# Patient Record
Sex: MALE | Race: WHITE | NOT HISPANIC OR LATINO | URBAN - METROPOLITAN AREA
[De-identification: names, ages, dates, MRNs, and addresses within clinical notes are randomized per-mention and may not be internally consistent; named-entity substitution may affect disease eponyms.]

---

## 2017-09-08 ENCOUNTER — EMERGENCY (EMERGENCY)
Facility: HOSPITAL | Age: 82
LOS: 1 days | Discharge: HOME | End: 2017-09-08
Attending: EMERGENCY MEDICINE | Admitting: EMERGENCY MEDICINE
Payer: MEDICARE

## 2017-09-08 VITALS
RESPIRATION RATE: 18 BRPM | SYSTOLIC BLOOD PRESSURE: 163 MMHG | OXYGEN SATURATION: 97 % | HEART RATE: 79 BPM | DIASTOLIC BLOOD PRESSURE: 100 MMHG | TEMPERATURE: 97 F

## 2017-09-08 VITALS
HEART RATE: 75 BPM | SYSTOLIC BLOOD PRESSURE: 152 MMHG | OXYGEN SATURATION: 98 % | DIASTOLIC BLOOD PRESSURE: 96 MMHG | RESPIRATION RATE: 18 BRPM

## 2017-09-08 LAB
ALBUMIN SERPL ELPH-MCNC: 4.6 G/DL — SIGNIFICANT CHANGE UP (ref 3.3–5)
ALP SERPL-CCNC: 97 U/L — SIGNIFICANT CHANGE UP (ref 40–120)
ALT FLD-CCNC: 12 U/L RC — SIGNIFICANT CHANGE UP (ref 10–45)
ANION GAP SERPL CALC-SCNC: 13 MMOL/L — SIGNIFICANT CHANGE UP (ref 5–17)
APTT BLD: 53.2 SEC — HIGH (ref 27.5–37.4)
AST SERPL-CCNC: 27 U/L — SIGNIFICANT CHANGE UP (ref 10–40)
BASOPHILS # BLD AUTO: 0 K/UL — SIGNIFICANT CHANGE UP (ref 0–0.2)
BASOPHILS NFR BLD AUTO: 0.4 % — SIGNIFICANT CHANGE UP (ref 0–2)
BILIRUB SERPL-MCNC: 0.7 MG/DL — SIGNIFICANT CHANGE UP (ref 0.2–1.2)
BUN SERPL-MCNC: 22 MG/DL — SIGNIFICANT CHANGE UP (ref 7–23)
CALCIUM SERPL-MCNC: 9.6 MG/DL — SIGNIFICANT CHANGE UP (ref 8.4–10.5)
CHLORIDE SERPL-SCNC: 105 MMOL/L — SIGNIFICANT CHANGE UP (ref 96–108)
CO2 SERPL-SCNC: 27 MMOL/L — SIGNIFICANT CHANGE UP (ref 22–31)
CREAT SERPL-MCNC: 1.27 MG/DL — SIGNIFICANT CHANGE UP (ref 0.5–1.3)
EOSINOPHIL # BLD AUTO: 0.2 K/UL — SIGNIFICANT CHANGE UP (ref 0–0.5)
EOSINOPHIL NFR BLD AUTO: 2.2 % — SIGNIFICANT CHANGE UP (ref 0–6)
GLUCOSE SERPL-MCNC: 98 MG/DL — SIGNIFICANT CHANGE UP (ref 70–99)
HCT VFR BLD CALC: 46.4 % — SIGNIFICANT CHANGE UP (ref 39–50)
HGB BLD-MCNC: 15.6 G/DL — SIGNIFICANT CHANGE UP (ref 13–17)
INR BLD: 3.5 RATIO — HIGH (ref 0.88–1.16)
LYMPHOCYTES # BLD AUTO: 1.5 K/UL — SIGNIFICANT CHANGE UP (ref 1–3.3)
LYMPHOCYTES # BLD AUTO: 16.1 % — SIGNIFICANT CHANGE UP (ref 13–44)
MCHC RBC-ENTMCNC: 28.5 PG — SIGNIFICANT CHANGE UP (ref 27–34)
MCHC RBC-ENTMCNC: 33.7 GM/DL — SIGNIFICANT CHANGE UP (ref 32–36)
MCV RBC AUTO: 84.7 FL — SIGNIFICANT CHANGE UP (ref 80–100)
MONOCYTES # BLD AUTO: 0.5 K/UL — SIGNIFICANT CHANGE UP (ref 0–0.9)
MONOCYTES NFR BLD AUTO: 5.2 % — SIGNIFICANT CHANGE UP (ref 2–14)
NEUTROPHILS # BLD AUTO: 7.1 K/UL — SIGNIFICANT CHANGE UP (ref 1.8–7.4)
NEUTROPHILS NFR BLD AUTO: 76.1 % — SIGNIFICANT CHANGE UP (ref 43–77)
PLATELET # BLD AUTO: 204 K/UL — SIGNIFICANT CHANGE UP (ref 150–400)
POTASSIUM SERPL-MCNC: 4.3 MMOL/L — SIGNIFICANT CHANGE UP (ref 3.5–5.3)
POTASSIUM SERPL-SCNC: 4.3 MMOL/L — SIGNIFICANT CHANGE UP (ref 3.5–5.3)
PROT SERPL-MCNC: 7.9 G/DL — SIGNIFICANT CHANGE UP (ref 6–8.3)
PROTHROM AB SERPL-ACNC: 39.1 SEC — HIGH (ref 9.8–12.7)
RBC # BLD: 5.48 M/UL — SIGNIFICANT CHANGE UP (ref 4.2–5.8)
RBC # FLD: 13.4 % — SIGNIFICANT CHANGE UP (ref 10.3–14.5)
SODIUM SERPL-SCNC: 145 MMOL/L — SIGNIFICANT CHANGE UP (ref 135–145)
WBC # BLD: 9.4 K/UL — SIGNIFICANT CHANGE UP (ref 3.8–10.5)
WBC # FLD AUTO: 9.4 K/UL — SIGNIFICANT CHANGE UP (ref 3.8–10.5)

## 2017-09-08 PROCEDURE — 85027 COMPLETE CBC AUTOMATED: CPT

## 2017-09-08 PROCEDURE — 80053 COMPREHEN METABOLIC PANEL: CPT

## 2017-09-08 PROCEDURE — 70450 CT HEAD/BRAIN W/O DYE: CPT

## 2017-09-08 PROCEDURE — 70450 CT HEAD/BRAIN W/O DYE: CPT | Mod: 26

## 2017-09-08 PROCEDURE — 72125 CT NECK SPINE W/O DYE: CPT | Mod: 26

## 2017-09-08 PROCEDURE — 85610 PROTHROMBIN TIME: CPT

## 2017-09-08 PROCEDURE — 99284 EMERGENCY DEPT VISIT MOD MDM: CPT | Mod: 25

## 2017-09-08 PROCEDURE — 72125 CT NECK SPINE W/O DYE: CPT

## 2017-09-08 PROCEDURE — 85730 THROMBOPLASTIN TIME PARTIAL: CPT

## 2017-09-08 PROCEDURE — 99284 EMERGENCY DEPT VISIT MOD MDM: CPT | Mod: GC

## 2017-09-08 NOTE — ED ADULT NURSE NOTE - PMH
Anemia    Aortic stenosis    Asthma    Basal cell cancer  s/p excision 7/2014  BPH (benign prostatic hypertrophy)    CAD (coronary artery disease)    Carotid artery disease    Colon cancer    Complete heart block    CVA (cerebral infarction)  1/2010  Depression    HTN (hypertension)    PNA (pneumonia)  3/2012  Ventricular tachycardia

## 2017-09-08 NOTE — ED PROVIDER NOTE - OBJECTIVE STATEMENT
82y Male PMH AS s/p AVR on coumadin, anemia, HTN complaining of fall. Patient slipped and lost his balance on carpeted steps at home at 4pm, fell on the ground on his buttocks, hit the occiput of his head through the plasterboard wall and had mild abrasions on the posterior head and neck, mild abrasion on the anterior right ear that did not penetrate through cartilage. Patient without pain, no LOC, able to ambulate without issues. Mainly concerned for head injury on coumadin. No walker or cane at [place], denies major recurrent falls. Last tetanus vaccine < 5 years ago. Denies fevers, chills, nausea, vomiting, diarrhea, constipation, chest pain, abd pain, or dyspnea.     PCP: Dr. Steven Goldberg.

## 2017-09-08 NOTE — ED PROVIDER NOTE - NS ED ROS FT
ROS: GENERAL: no fevers, no chills HEENT: no epistaxis, no eye pain, no ear pain, no throat pain CARDIAC: no chest pain, no shortness of breath PULM: no cough, no shortness of breath GI: no nausea, no vomiting, no diarrhea, no abdominal pain, no hematemesis, no bright red blood per rectum : no dysuria, no hematuria EXTREMITIES: no arm pain, no leg pain, no back pain SKIN: no purpura, no petechiae, + abrasions NEURO: no headache, no neck pain, no loss of strength/sensation HEME: no easy bruising, no easy bleeding

## 2017-09-08 NOTE — ED PROVIDER NOTE - ATTENDING CONTRIBUTION TO CARE
Patient on coumadin for mechanical heart valve presenting after mechanical fall with head trauma.  Unknown last INR.  No LOC, no vision changes, no nausea or vomiting.  Ambulatory after fall.  No other complaints.  On exam patient mildly hypertensive, otherwise vital signs within normal limits, neurologically intact, ambulatory in ED, no visible trauma.  Given age and on anticoagulation will CT head for screening for occult injury, check INR.

## 2017-09-08 NOTE — ED ADULT NURSE NOTE - OBJECTIVE STATEMENT
81 yo male came to the ED s/p fall on stairs of his house and hit the back of his head. Pt states he did not LOC, remembers everything and "I feel good, I just came because I am on coumadin and my cardiologist said I always need to come when I fall". Pt walked to the stretcher, steady gate, alerted and oriented x3 with family at the bedside. Patient has no active bleeding, back of the neck with redness and part of his left shoulder. Heart sounds normal, vitals WNL, lungs clear. Abdomen soft, non distended. Pt able to move all extremities, no break on skin. Pulses strong and present on all extremities. Blood drawn, sent to lab, IV meryc placed on left AC 20 G. MD at the bedside. Will continue to monitor closely. 81 yo male came to the ED s/p fall on stairs of his house and hit the back of his head. Pt states he did not LOC, remembers everything and "I feel good, I just came because I am on coumadin and my cardiologist said I always need to come when I fall". Pt walked to the stretcher, steady gate, alerted and oriented x3 with family at the bedside. Patient has no active bleeding, back of the neck with redness and part of his left shoulder. Heart sounds normal, vitals WNL, lungs clear. Abdomen soft, non distended. Vitals wnl except BP - higher on arrival, at this moment, 152/96 Pt able to move all extremities, no break on skin. Pulses strong and present on all extremities. Blood drawn, sent to lab, IV mercy placed on left AC 20 G. MD at the bedside. Will continue to monitor closely.

## 2017-09-08 NOTE — ED PROVIDER NOTE - PHYSICAL EXAMINATION
PE: CONSTITUTIONAL: Well appearing, well nourished, in no apparent distress. ENMT: Airway patent, nasal mucosa clear, mouth with normal mucosa. HEAD: NCAT EYES: PERRL, EOMI bilaterally CARDIAC: RRR, no m/r/g, no pedal edema RESPIRATORY: CTA bilaterally, no adventitious sounds GI: Abdomen non-distended, non-tender MSK: Spine appears normal, range of motion is not limited, no muscle/joint tenderness NEURO: CNII-XII grossly intact, 5/5 strength, full sensation all extremities, gait intact SKIN: Skin tone normal in color, warm and dry. Mild abrasions on the posterior neck without bleeding, mild dried blood on the anterior ear without penetrating injury.

## 2017-09-08 NOTE — ED PROVIDER NOTE - CARE PLAN
Principal Discharge DX:	Head injury  Instructions for follow-up, activity and diet:	1) Please follow-up with your primary care doctor within the next 3 days.  Please call today or tomorrow for an appointment.  If you cannot follow-up with your doctor(s), please return to the ED for any urgent issues.  2) If you have any worsening of symptoms or any other concerns please return to the ED immediately.  3) Please continue taking your home medications as directed.

## 2017-09-08 NOTE — ED ADULT NURSE NOTE - CHPI ED SYMPTOMS NEG
no weakness/no loss of consciousness/no fever/no numbness/no abrasion/no confusion/no vomiting/no bleeding/no tingling

## 2017-09-08 NOTE — ED ADULT NURSE NOTE - PSH
H/O cardiac pacemaker    S/P appendectomy    S/P AVR  11/1996  S/P CABG x 1  11/1996  S/P tonsillectomy

## 2017-09-08 NOTE — ED PROVIDER NOTE - PLAN OF CARE
1) Please follow-up with your primary care doctor within the next 3 days.  Please call today or tomorrow for an appointment.  If you cannot follow-up with your doctor(s), please return to the ED for any urgent issues.  2) If you have any worsening of symptoms or any other concerns please return to the ED immediately.  3) Please continue taking your home medications as directed.

## 2017-10-16 ENCOUNTER — EMERGENCY (EMERGENCY)
Facility: HOSPITAL | Age: 82
LOS: 1 days | Discharge: HOME | End: 2017-10-16
Attending: EMERGENCY MEDICINE | Admitting: EMERGENCY MEDICINE
Payer: MEDICARE

## 2017-10-16 VITALS
RESPIRATION RATE: 18 BRPM | DIASTOLIC BLOOD PRESSURE: 89 MMHG | OXYGEN SATURATION: 98 % | SYSTOLIC BLOOD PRESSURE: 201 MMHG | WEIGHT: 164.91 LBS | HEART RATE: 84 BPM

## 2017-10-16 VITALS
HEART RATE: 60 BPM | RESPIRATION RATE: 18 BRPM | SYSTOLIC BLOOD PRESSURE: 184 MMHG | DIASTOLIC BLOOD PRESSURE: 77 MMHG | OXYGEN SATURATION: 100 %

## 2017-10-16 LAB
APTT BLD: 44.8 SEC — HIGH (ref 27.5–37.4)
BASOPHILS # BLD AUTO: 0 K/UL — SIGNIFICANT CHANGE UP (ref 0–0.2)
BASOPHILS NFR BLD AUTO: 0.6 % — SIGNIFICANT CHANGE UP (ref 0–2)
EOSINOPHIL # BLD AUTO: 0.2 K/UL — SIGNIFICANT CHANGE UP (ref 0–0.5)
EOSINOPHIL NFR BLD AUTO: 2.6 % — SIGNIFICANT CHANGE UP (ref 0–6)
HCT VFR BLD CALC: 44.5 % — SIGNIFICANT CHANGE UP (ref 39–50)
HGB BLD-MCNC: 15.1 G/DL — SIGNIFICANT CHANGE UP (ref 13–17)
INR BLD: 2.57 RATIO — HIGH (ref 0.88–1.16)
LYMPHOCYTES # BLD AUTO: 1.4 K/UL — SIGNIFICANT CHANGE UP (ref 1–3.3)
LYMPHOCYTES # BLD AUTO: 18.4 % — SIGNIFICANT CHANGE UP (ref 13–44)
MCHC RBC-ENTMCNC: 28.7 PG — SIGNIFICANT CHANGE UP (ref 27–34)
MCHC RBC-ENTMCNC: 34 GM/DL — SIGNIFICANT CHANGE UP (ref 32–36)
MCV RBC AUTO: 84.6 FL — SIGNIFICANT CHANGE UP (ref 80–100)
MONOCYTES # BLD AUTO: 0.4 K/UL — SIGNIFICANT CHANGE UP (ref 0–0.9)
MONOCYTES NFR BLD AUTO: 5.7 % — SIGNIFICANT CHANGE UP (ref 2–14)
NEUTROPHILS # BLD AUTO: 5.5 K/UL — SIGNIFICANT CHANGE UP (ref 1.8–7.4)
NEUTROPHILS NFR BLD AUTO: 72.7 % — SIGNIFICANT CHANGE UP (ref 43–77)
PLATELET # BLD AUTO: 183 K/UL — SIGNIFICANT CHANGE UP (ref 150–400)
PROTHROM AB SERPL-ACNC: 28.6 SEC — HIGH (ref 9.8–12.7)
RBC # BLD: 5.26 M/UL — SIGNIFICANT CHANGE UP (ref 4.2–5.8)
RBC # FLD: 13.2 % — SIGNIFICANT CHANGE UP (ref 10.3–14.5)
WBC # BLD: 7.6 K/UL — SIGNIFICANT CHANGE UP (ref 3.8–10.5)
WBC # FLD AUTO: 7.6 K/UL — SIGNIFICANT CHANGE UP (ref 3.8–10.5)

## 2017-10-16 PROCEDURE — 99284 EMERGENCY DEPT VISIT MOD MDM: CPT

## 2017-10-16 PROCEDURE — 85610 PROTHROMBIN TIME: CPT

## 2017-10-16 PROCEDURE — 85027 COMPLETE CBC AUTOMATED: CPT

## 2017-10-16 PROCEDURE — 85730 THROMBOPLASTIN TIME PARTIAL: CPT

## 2017-10-16 RX ORDER — AMLODIPINE BESYLATE 2.5 MG/1
5 TABLET ORAL ONCE
Qty: 0 | Refills: 0 | Status: COMPLETED | OUTPATIENT
Start: 2017-10-16 | End: 2017-10-16

## 2017-10-16 RX ORDER — AMLODIPINE BESYLATE 2.5 MG/1
10 TABLET ORAL ONCE
Qty: 0 | Refills: 0 | Status: DISCONTINUED | OUTPATIENT
Start: 2017-10-16 | End: 2017-10-16

## 2017-10-16 RX ORDER — TRANEXAMIC ACID 100 MG/ML
650 INJECTION, SOLUTION INTRAVENOUS ONCE
Qty: 0 | Refills: 0 | Status: COMPLETED | OUTPATIENT
Start: 2017-10-16 | End: 2017-10-16

## 2017-10-16 RX ADMIN — AMLODIPINE BESYLATE 5 MILLIGRAM(S): 2.5 TABLET ORAL at 19:03

## 2017-10-16 RX ADMIN — TRANEXAMIC ACID 650 MILLIGRAM(S): 100 INJECTION, SOLUTION INTRAVENOUS at 20:16

## 2017-10-16 NOTE — ED ADULT NURSE NOTE - OBJECTIVE STATEMENT
83 yr old male on Coumadin present to the Er for bleeding gums. Pt reports that he woke up this morning with his gum bleeding . Pt reports that he had similar episode in the past however not this severe. pt denies pain at present .

## 2017-10-16 NOTE — ED PROVIDER NOTE - MEDICAL DECISION MAKING DETAILS
Brittany: 83M w/PMH AVR on coumadin, htn, multiple other comorbidities p/w gingival bleeding; localized to upper and lower R molars, no focal lesion/ulcer/abrasion/lac, no loose teeth or dental pain. Therapeutic INR OP but will recheck here. Plan for TXA locally applied and possible dental consult if unable to control.

## 2017-10-16 NOTE — ED PROVIDER NOTE - OBJECTIVE STATEMENT
83M w/PMH AVR (on coumadin goal INR 2.5-3.5), htn, CVA 2010 w/o deficits, CAD, asthma, remote basal cell CA and colon CA, CHB s/p AICD, asthma p/w gingival bleeding x1d. Began after brushing teeth this AM and has not stopped. Went to PMD who referred here for further eval, INR there reportedly 2.8. Denies trauma, dental pain. Had some (unknown what per patient) dental procedure 1 month ago. Denies easy bruising, hematuria, melana/BRBPR, epistaxis. Denies other blood thinners. Has mild gingival bleeding with tooth brushing at a baseline but first episode like this. Denies cp/sob/lightheadedness. Incidentally BP elevated but no weakness/numbness/headache, cp/sob, dec UO.

## 2017-10-16 NOTE — ED PROVIDER NOTE - ENMT, MLM
Airway patent, Nasal mucosa clear. Mouth with significant oozing from upper and lower molar gingiva, no  lesions/ulcers/lacs/abrasion. +crown with slight crack at R upper molar. Throat has no vesicles, no oropharyngeal exudates and uvula is midline. Airway patent, Nasal mucosa clear. Mouth with significant oozing from R upper molar gingiva, no  lesions/ulcers/lacs/abrasion. +crown with slight crack at R upper molar. Throat has no vesicles, no oropharyngeal exudates and uvula is midline.

## 2017-10-16 NOTE — ED PROVIDER NOTE - PROGRESS NOTE DETAILS
No active bleeding now s/p dental intervention. No active bleeding now s/p dental intervention. Surgicel placed by dental. Rec soft diet x24 hours. Return precautions discussed.

## 2017-10-16 NOTE — ED PROVIDER NOTE - ATTENDING CONTRIBUTION TO CARE
83y M PMHx of AVR on Coumadin (goal INR 2.5-3.5), CHB sp AICD, CAD here with gingival bleeding which started earlier today, has been intermittent, but this evening became constant. Was seen by PCP who did INR check and sent in for eval. INR reportedly 2.8 in office. Pt denies any injury or trauma. No recent dental intervention. No other complaint. On exam he is awake and alert, airway is intact, brisk oozing from labial side of R upper molars around teeth 3,4 and in between teeth as well. Plan to check CBC, INR. Attempt hemostasis thru application of pressure to area w TXA soaked pledgets. Will also consult dental for further mgmt, tx recs.

## 2017-10-17 NOTE — PROGRESS NOTE ADULT - SUBJECTIVE AND OBJECTIVE BOX
HPI: Patient is a 83y old  Male who presents with a chief complaint of continuous bleeding of upper right gingiva as of this morning.     PAST MEDICAL & SURGICAL HISTORY:  Ventricular tachycardia  Basal cell cancer: s/p excision 7/2014  Asthma  PNA (pneumonia): 3/2012  Carotid artery disease  Colon cancer  Anemia  Complete heart block  BPH (benign prostatic hypertrophy)  CAD (coronary artery disease)  Depression  HTN (hypertension)  CVA (cerebral infarction): 1/2010  Aortic stenosis  H/O cardiac pacemaker  S/P tonsillectomy  S/P CABG x 1: 11/1996  S/P appendectomy  S/P AVR: 11/1996    Allergies    No Known Allergies    Intolerances      FAMILY HISTORY:  Family history of heart disease  Family history of pancreatic cancer    SOCIAL HISTORY: Patient presented with wife.     Radiographs: none    LABS:                        15.1   7.6   )-----------( 183      ( 16 Oct 2017 18:38 )             44.5       WBC Count: 7.6 K/uL [3.8 - 10.5] (10-16 @ 18:38)    Platelet Count - Automated: 183 K/uL [150 - 400] (10-16 @ 18:38)    INR: 2.57 ratio <H> [0.88 - 1.16] (10-16 @ 18:38)    ASSESSMENT: Extraoral exam: TMJ within normal limits. No swelling or asymmetry. No trouble breathing or swallowing. Intraoral exam: Continuous bleeding from gingival margins #3 and #4. No pain, swelling or signs of acute infection.     PROCEDURE:  Verbal consent given. Digital pressure with gauze applied to gingiva #3/4. Surgicel placed and additional digital pressure with gauze applied to teeth #3/4 until hemostasis was achieved.     RECOMMENDATIONS:   1) Continue biting on guaze until get home.   2) No use of straw of spitting   3) F/U with outpatient dentist if bleeding starts again.   4) F/U with outpatient dentist for comprehensive dental care.   5) If any difficulty swallowing/breathing, fever occur, page dental.     Danielle Sacks DDS

## 2020-11-03 ENCOUNTER — OUTPATIENT (OUTPATIENT)
Dept: OUTPATIENT SERVICES | Facility: HOSPITAL | Age: 85
LOS: 1 days | End: 2020-11-03
Payer: MEDICARE

## 2020-11-03 DIAGNOSIS — Z11.59 ENCOUNTER FOR SCREENING FOR OTHER VIRAL DISEASES: ICD-10-CM

## 2020-11-03 LAB — SARS-COV-2 RNA SPEC QL NAA+PROBE: SIGNIFICANT CHANGE UP

## 2020-11-03 PROCEDURE — U0003: CPT

## 2020-11-04 ENCOUNTER — TRANSCRIPTION ENCOUNTER (OUTPATIENT)
Age: 85
End: 2020-11-04

## 2020-11-05 ENCOUNTER — OUTPATIENT (OUTPATIENT)
Dept: OUTPATIENT SERVICES | Facility: HOSPITAL | Age: 85
LOS: 1 days | End: 2020-11-05
Payer: MEDICARE

## 2020-11-05 VITALS
RESPIRATION RATE: 17 BRPM | HEART RATE: 64 BPM | OXYGEN SATURATION: 98 % | DIASTOLIC BLOOD PRESSURE: 94 MMHG | SYSTOLIC BLOOD PRESSURE: 160 MMHG

## 2020-11-05 VITALS
DIASTOLIC BLOOD PRESSURE: 89 MMHG | HEART RATE: 65 BPM | OXYGEN SATURATION: 99 % | TEMPERATURE: 97 F | WEIGHT: 155.21 LBS | HEIGHT: 65 IN | SYSTOLIC BLOOD PRESSURE: 180 MMHG | RESPIRATION RATE: 15 BRPM

## 2020-11-05 DIAGNOSIS — Z98.49 CATARACT EXTRACTION STATUS, UNSPECIFIED EYE: Chronic | ICD-10-CM

## 2020-11-05 DIAGNOSIS — H35.372 PUCKERING OF MACULA, LEFT EYE: ICD-10-CM

## 2020-11-05 LAB
INR BLD: 1.44 RATIO — HIGH (ref 0.88–1.16)
PROTHROM AB SERPL-ACNC: 17.1 SEC — HIGH (ref 10.6–13.6)

## 2020-11-05 PROCEDURE — 36415 COLL VENOUS BLD VENIPUNCTURE: CPT

## 2020-11-05 PROCEDURE — 85610 PROTHROMBIN TIME: CPT

## 2020-11-05 PROCEDURE — 67041 VIT FOR MACULAR PUCKER: CPT | Mod: LT

## 2020-11-05 NOTE — ASU PATIENT PROFILE, ADULT - PSH
H/O cardiac pacemaker    S/P appendectomy    S/P AVR  11/1996  S/P CABG x 1  11/1996  S/P tonsillectomy     H/O cardiac pacemaker    S/P appendectomy    S/P AVR  11/1996  S/P CABG x 1  11/1996  S/P tonsillectomy    Status post cataract extraction  OU PC IOL

## 2020-11-05 NOTE — ASU DISCHARGE PLAN (ADULT/PEDIATRIC) - CALL YOUR DOCTOR IF YOU HAVE ANY OF THE FOLLOWING:
Nausea and vomiting that does not stop/Pain not relieved by Medications/Fever greater than (need to indicate Fahrenheit or Celsius)/Swelling that gets worse/Bleeding that does not stop

## 2020-11-05 NOTE — ASU DISCHARGE PLAN (ADULT/PEDIATRIC) - CARE PROVIDER_API CALL
Austin Blake  OPHTHALMOLOGY  [address] [place] [address], [facility] [address]  [place] [address]  Phone: [phone]  Fax: [phone]  Scheduled Appointment: 11/06/2020 11:30 AM

## 2023-04-01 NOTE — ED PROVIDER NOTE - GASTROINTESTINAL, MLM
Right eye redness and yellow drainage to eye lid.  Bilateral eyes cleansed with Navin & Navin baby shampoo mixed in warm water to cleanse drainage off lids as well as bacteria.  Dr. Shawnee de anda. Pt tolerated without discomfort.   Abdomen soft, non-tender, no guarding.

## 2024-03-12 ENCOUNTER — OUTPATIENT (OUTPATIENT)
Dept: INPATIENT UNIT | Facility: HOSPITAL | Age: 89
LOS: 1 days | End: 2024-03-12
Payer: MEDICARE

## 2024-03-12 ENCOUNTER — TRANSCRIPTION ENCOUNTER (OUTPATIENT)
Age: 89
End: 2024-03-12

## 2024-03-12 VITALS
OXYGEN SATURATION: 96 % | RESPIRATION RATE: 15 BRPM | HEART RATE: 60 BPM | DIASTOLIC BLOOD PRESSURE: 66 MMHG | SYSTOLIC BLOOD PRESSURE: 138 MMHG

## 2024-03-12 VITALS
SYSTOLIC BLOOD PRESSURE: 148 MMHG | DIASTOLIC BLOOD PRESSURE: 68 MMHG | RESPIRATION RATE: 15 BRPM | HEART RATE: 60 BPM | HEIGHT: 67 IN | WEIGHT: 145.06 LBS | OXYGEN SATURATION: 98 % | TEMPERATURE: 98 F

## 2024-03-12 DIAGNOSIS — Z45.010 ENCOUNTER FOR CHECKING AND TESTING OF CARDIAC PACEMAKER PULSE GENERATOR [BATTERY]: ICD-10-CM

## 2024-03-12 DIAGNOSIS — Z98.49 CATARACT EXTRACTION STATUS, UNSPECIFIED EYE: Chronic | ICD-10-CM

## 2024-03-12 LAB
INR BLD: 2.71 RATIO — HIGH (ref 0.85–1.18)
PROTHROM AB SERPL-ACNC: 29 SEC — HIGH (ref 9.5–13)

## 2024-03-12 PROCEDURE — C1721: CPT

## 2024-03-12 PROCEDURE — 85610 PROTHROMBIN TIME: CPT

## 2024-03-12 PROCEDURE — 93005 ELECTROCARDIOGRAM TRACING: CPT

## 2024-03-12 PROCEDURE — C1889: CPT

## 2024-03-12 PROCEDURE — 33263 RMVL & RPLCMT DFB GEN 2 LEAD: CPT

## 2024-03-12 RX ORDER — CEFAZOLIN SODIUM 1 G
1000 VIAL (EA) INJECTION ONCE
Refills: 0 | Status: COMPLETED | OUTPATIENT
Start: 2024-03-12 | End: 2024-03-12

## 2024-03-12 RX ORDER — ONDANSETRON 8 MG/1
4 TABLET, FILM COATED ORAL ONCE
Refills: 0 | Status: DISCONTINUED | OUTPATIENT
Start: 2024-03-12 | End: 2024-03-12

## 2024-03-12 RX ORDER — CEFAZOLIN SODIUM 1 G
2000 VIAL (EA) INJECTION ONCE
Refills: 0 | Status: COMPLETED | OUTPATIENT
Start: 2024-03-12 | End: 2024-03-12

## 2024-03-12 RX ORDER — ACETAMINOPHEN 500 MG
1000 TABLET ORAL ONCE
Refills: 0 | Status: COMPLETED | OUTPATIENT
Start: 2024-03-12 | End: 2024-03-12

## 2024-03-12 RX ORDER — ACETAMINOPHEN 500 MG
650 TABLET ORAL EVERY 6 HOURS
Refills: 0 | Status: DISCONTINUED | OUTPATIENT
Start: 2024-03-12 | End: 2024-03-12

## 2024-03-12 RX ORDER — SODIUM CHLORIDE 9 MG/ML
3 INJECTION INTRAMUSCULAR; INTRAVENOUS; SUBCUTANEOUS EVERY 8 HOURS
Refills: 0 | Status: DISCONTINUED | OUTPATIENT
Start: 2024-03-12 | End: 2024-03-12

## 2024-03-12 RX ORDER — CEPHALEXIN 500 MG
1 CAPSULE ORAL
Qty: 9 | Refills: 0
Start: 2024-03-12 | End: 2024-03-14

## 2024-03-12 RX ORDER — ACETAMINOPHEN 500 MG
2 TABLET ORAL
Qty: 0 | Refills: 0 | DISCHARGE
Start: 2024-03-12

## 2024-03-12 RX ADMIN — Medication 100 MILLIGRAM(S): at 13:12

## 2024-03-12 RX ADMIN — Medication 1000 MILLIGRAM(S): at 13:16

## 2024-03-12 RX ADMIN — Medication 400 MILLIGRAM(S): at 12:45

## 2024-03-12 RX ADMIN — SODIUM CHLORIDE 3 MILLILITER(S): 9 INJECTION INTRAMUSCULAR; INTRAVENOUS; SUBCUTANEOUS at 13:38

## 2024-03-12 NOTE — ASU DISCHARGE PLAN (ADULT/PEDIATRIC) - CALL YOUR DOCTOR IF YOU HAVE ANY OF THE FOLLOWING:
FEVER>100.4/Bleeding that does not stop/Swelling that gets worse/Pain not relieved by Medications/Fever greater than (need to indicate Fahrenheit or Celsius)/Wound/Surgical Site with redness, or foul smelling discharge or pus/Nausea and vomiting that does not stop/Inability to tolerate liquids or foods/Increased irritability or sluggishness Bleeding that does not stop/Swelling that gets worse/Pain not relieved by Medications/Fever greater than (need to indicate Fahrenheit or Celsius)/Wound/Surgical Site with redness, or foul smelling discharge or pus/Nausea and vomiting that does not stop/Inability to tolerate liquids or foods/Increased irritability or sluggishness

## 2024-03-12 NOTE — ASU DISCHARGE PLAN (ADULT/PEDIATRIC) - CARE PROVIDER_API CALL
Oleg Carpenter  Cardiac Electrophysiology  [address] [place] [address], [facility] [address]  [place] [address]  Phone: [phone]  Fax: [phone]  Established Patient  Follow Up Time: 2 weeks

## 2024-03-12 NOTE — H&P CARDIOLOGY - HISTORY OF PRESENT ILLNESS
88 y/o male with PMH of HTN, HLD, CAD s/p CABG x 1 V and mechanical AVR for severe AS (1996) on Coumadin Last dose 3/9/24 (INR today 2.71 w/goal INR 2.5-3.5), MDT PPM for complete heart block s/p upgrade to ICD/PPM in 2014 for VT(39s) with Left systolic HF, CVA with no residual, asthma, CT on 3/7/24 with findings of 5.7cm left hilar mass w/encasement and narrowing of the superior segment LLL bronchus 1.2cm solid mass posterior to the left hilar mass, Aneurysmal dilation of the ascending aorta up to 4.7cm is followed by Dr. Stephen Goldberg, Cardiologist and presents for PPM/ICD Gen change with Dr. Oleg Carpenter due to FERNANDO.  He denies cp, sob or palpitations.

## 2024-03-12 NOTE — PATIENT PROFILE ADULT - FALL HARM RISK - HARM RISK INTERVENTIONS

## 2024-03-12 NOTE — CHART NOTE - NSCHARTNOTEFT_GEN_A_CORE
s/p Dual chamber ICD Gen change ([employer]) under conscious sedation    Successful replacement of a dual-chamber cardioverter defibrillator.    Successful defibrillator revision   Successful creation of new sub-pectoral pocket     Site Left ACW benign - no hematoma or bleeding noted on exam     Per Dr Pauline Benson 1 gram at 13:30 hours then   Keflex 500mg TID x 3 days - sent to [facility]  Resume coumadin 3/13 [facility]  CXray not warranted  BR x 4 hours   DC home in 6 hours if stable   Wound check in 10-14 days.

## 2024-03-12 NOTE — ASU DISCHARGE PLAN (ADULT/PEDIATRIC) - ASU DC SPECIAL INSTRUCTIONSFT
Your incision is closed with either surgical tape, staples or a surgical glue  - DO NOT scrub, rub, or pick at the site    AFTER 3 days, you may shower   - Use mild sop and gentle water stream, then pat dry with a towel   - DO NOT apply lotions, powders, ointments, or perfumes to the incision    DO NOT soak your incisional site in water fo 4-6 weeks (no baths, swimming, hot tub...)  Wear loose clothing around site for 1-2 weeks  IF surgical tape was used DO NOT remove the strips, they will fall off on their own   IF surgical glue was used, it will naturally fall off within 3 weeks  if staples were used, they will be removed in 7-10 days by your doctor    ACTIVITY:  for 2 weeks AFTER  your procedure  - DO NOT RAISE your arm above shoulder level (on the same side of your incision)  for 4 weeks AFTER your procedure   - DO NOT LIFT anything 10 lbs or heavier (on the side of your implant)   - certain activities may be limited longer, those that involve swinging your arm, and will be discussed with your EP doctor  DO NOT DRIVE until your EP Doctor or nurse practitioner/ physician assistant states it is safe to do so  A follow up appointment in 7-14 days will be arranged before your discharge    ID CARD:   you will receive an ID CARD and device company booklet   - please carry that card with you at all times    DIET   Follow the heart healthy diet recommended by you doctor   If you smoke, we recommend you quit. It will immediately improve your health     - If you would like to let us help you live smoke-free, call the [facility] at [phone]    ***CALL YOUR DOCTOR ***  IF you have fever, chills, body aches, or severe pain, swelling, redness, heat, yellow drainage from your incision site  IF bleeding  or significant new swelling from your puncture site  IF your experience lightheadedness, dizziness, or fainting spell  IF unable to get in contact with your doctor, you may call the Cardiology Office at [facility] at [phone] Please see preprinted discharge instruction sheet    Followup with Dr. Carpenter for wound check as scheduled

## 2024-03-12 NOTE — ASU DISCHARGE PLAN (ADULT/PEDIATRIC) - C. MAKE IMPORTANT PERSONAL OR BUSINESS DECISIONS
Instructions: Daily dressing change    1. Soak affected area in a mixture of 1/2 water and 1/2 hydrogen peroxide    2. Gently dry off    3. Apply xeroform to cover the open area (included in bag)   Yellow gauze in silver package. You may cut off portion you need and put the rest in a ziplock bag to use next time.     4. Apply guaze to finger and gently wrap with white gauze roll.    5. Wrap with coban (brown tape)      Call with any questions/concerns or signs of infection, including redness, warmth, drainage, increased pain, increased swelling, fevers/chills.   Statement Selected

## 2024-03-12 NOTE — PRE-ANESTHESIA EVALUATION ADULT - NSANTHOSAYNRD_GEN_A_CORE
No. WILLEM screening performed.  STOP BANG Legend: 0-2 = LOW Risk; 3-4 = INTERMEDIATE Risk; 5-8 = HIGH Risk

## 2024-03-12 NOTE — H&P CARDIOLOGY - NSICDXPASTMEDICALHX_GEN_ALL_CORE_FT
PAST MEDICAL HISTORY:  Anemia     Aortic stenosis     Asthma     Basal cell cancer s/p excision 7/2014    BPH (benign prostatic hypertrophy)     CAD (coronary artery disease)     Carotid artery disease     Colon cancer     Complete heart block     CVA (cerebral infarction) 1/2010    Depression     HTN (hypertension)     PNA (pneumonia) 3/2012    Ventricular tachycardia

## 2024-03-12 NOTE — PRE-ANESTHESIA EVALUATION ADULT - NSANTHPMHFT_GEN_ALL_CORE
88 y/o male with PMH of HTN, HLD, CAD s/p CABG x 1 V and mechanical AVR for severe AS (1996) on Coumadin Last dose 3/9/24 (INR today 2.71 w/goal INR 2.5-3.5), MDT PPM for complete heart block s/p upgrade to ICD/PPM in 2014 for VT(39s) with Left systolic HF, CVA with no residual, asthma, CT on 3/7/24 with findings of 5.7cm left hilar mass w/encasement and narrowing of the superior segment LLL bronchus 1.2cm solid mass posterior to the left hilar mass, Aneurysmal dilation of the ascending aorta up to 4.7cm is followed by Dr. Stephen Goldberg, Cardiologist and presents for PPM/ICD Gen change with Dr. Oleg Carpenter due to FERNANDO.

## 2024-03-14 RX ORDER — CHOLECALCIFEROL (VITAMIN D3) 125 MCG
1 CAPSULE ORAL
Refills: 0 | DISCHARGE

## 2024-03-14 RX ORDER — MIRABEGRON 50 MG/1
1 TABLET, EXTENDED RELEASE ORAL
Refills: 0 | DISCHARGE

## 2024-03-14 RX ORDER — AMLODIPINE BESYLATE 2.5 MG/1
1 TABLET ORAL
Refills: 0 | DISCHARGE

## 2024-03-24 NOTE — PATIENT PROFILE ADULT - FUNCTIONAL ASSESSMENT - BASIC MOBILITY 2.
Diagnosis: Acute metabolic encephalopathy [[id number]]   Future Attending Provider: HARISH GUZMAN [[id number]]   Admit to which facility:: [facility] [[id number]]   Reason for IP Medical Treatment  (Clinical interventions that can only be accomplished in the IP setting? ) :: need rx   I certify that Inpatient services for greater than or equal to 2 midnights are medically necessary:: Yes   Plans for Post-Acute care--if anticipated (pick the single best option):: A. No post acute care anticipated at this time   Special Needs:: No Special Needs [1]  
4 = No assist / stand by assistance

## 2024-03-27 NOTE — ED PROVIDER NOTE - CPE EDP ENMT NORM
Med: alendronate  Dosage: 70 mg  Sig:  Take 1 tablet by mouth every 7 days. Full glass water empty stomach do not lay down for 30 min no food 60 min  Quantity requested:  13      Mail Order: no    Preferred pharmacy has been set up and verified. normal...